# Patient Record
Sex: FEMALE | Race: WHITE | NOT HISPANIC OR LATINO
[De-identification: names, ages, dates, MRNs, and addresses within clinical notes are randomized per-mention and may not be internally consistent; named-entity substitution may affect disease eponyms.]

---

## 2023-05-08 ENCOUNTER — NON-APPOINTMENT (OUTPATIENT)
Age: 48
End: 2023-05-08

## 2023-05-08 ENCOUNTER — APPOINTMENT (OUTPATIENT)
Dept: COLORECTAL SURGERY | Facility: CLINIC | Age: 48
End: 2023-05-08
Payer: COMMERCIAL

## 2023-05-08 VITALS
BODY MASS INDEX: 34.55 KG/M2 | HEIGHT: 60 IN | WEIGHT: 176 LBS | DIASTOLIC BLOOD PRESSURE: 85 MMHG | HEART RATE: 94 BPM | TEMPERATURE: 98.3 F | SYSTOLIC BLOOD PRESSURE: 152 MMHG

## 2023-05-08 DIAGNOSIS — Z78.9 OTHER SPECIFIED HEALTH STATUS: ICD-10-CM

## 2023-05-08 PROCEDURE — 99205 OFFICE O/P NEW HI 60 MIN: CPT

## 2023-05-08 RX ORDER — MULTIVITAMIN
TABLET ORAL
Refills: 0 | Status: ACTIVE | COMMUNITY

## 2023-05-08 RX ORDER — PHENTERMINE HYDROCHLORIDE 37.5 MG/1
CAPSULE ORAL
Refills: 0 | Status: ACTIVE | COMMUNITY

## 2023-05-08 RX ORDER — ASPIRIN 325 MG/1
TABLET, FILM COATED ORAL
Refills: 0 | Status: ACTIVE | COMMUNITY

## 2023-05-08 RX ORDER — NORETHINDRONE ACETATE/ETHINYL ESTRADIOL AND FERROUS FUMARATE 1MG-20(24)
KIT ORAL
Refills: 0 | Status: ACTIVE | COMMUNITY

## 2023-05-08 NOTE — HISTORY OF PRESENT ILLNESS
[FreeTextEntry1] : 47 yo F presents for evaluation of colon cancer, referred by [facility]\par Denies PMH\par PSH  (14 years ago), gastric bypass (9 years ago), cholecystectomy (2022)\par Medication: Oral contraceptive, ASA 81 mg, phentermine \par Denies FMH CRC\par \par Pt underwent initial colonoscopy on 2023, prep fair.\par Melanosis coli, redundant colon\par Tumor in proximal sigmoid colon, biopsied, tattooed\par Plan: PET/CT\par \par Pathology:\par Minute superficial fragment of adenocarcinoma\par Separate fragment of colonic mucosa showing focal hyperplastic change\par Note: Depth of invasion cannot be accurately evaluated on this superficial fragment of biopsy specimen\par \par Denies fever, unintentional weight loss, abd pain, n/v/d, change in bowel habits, BRBPR\par \par BH: daily, takes HerbLax daily\par h/o constipation that she previously managed w/ Dulcolax as needed\par \par Takes  ASA 81 mg daily, denies h/o heart disease or DVT\par

## 2023-05-08 NOTE — ASSESSMENT
[FreeTextEntry1] : I had extensive discussion with the patient and her –60 minutes–regarding the diagnosis and treatment options. I recommended that he consider proceeding with a robotic sigmoid colectomy.\par The associated risks, benefits, alternatives of the procedure have been outlined discussed and reviewed with the patient's family. These risks including but not limited to bleeding, infection, anastomotic leak, need for secondary surgery, need for ileostomy or colostomy creation, change in bowel habits,  as well as the risk of heart and lung complications infection and death were detailed. The patient understands these risks and consents the planned procedure. Appropriate  literature regarding surgery and post operative treatment/complications and enhanced recovery pathway has been detailed and reviewed. Consent was obtained. All questions were answered.\par \par CT scan ordered.\par \par

## 2023-05-09 ENCOUNTER — RESULT REVIEW (OUTPATIENT)
Age: 48
End: 2023-05-09

## 2023-05-09 PROBLEM — Z78.9 NON-SMOKER: Status: ACTIVE | Noted: 2023-05-08

## 2023-05-09 RX ORDER — CIPROFLOXACIN HYDROCHLORIDE 500 MG/1
500 TABLET, FILM COATED ORAL
Qty: 2 | Refills: 0 | Status: ACTIVE | COMMUNITY
Start: 2023-05-09 | End: 1900-01-01

## 2023-05-09 RX ORDER — METRONIDAZOLE 500 MG/1
500 TABLET ORAL
Qty: 6 | Refills: 0 | Status: ACTIVE | COMMUNITY
Start: 2023-05-09 | End: 1900-01-01

## 2023-05-11 ENCOUNTER — APPOINTMENT (OUTPATIENT)
Dept: CT IMAGING | Facility: CLINIC | Age: 48
End: 2023-05-11
Payer: COMMERCIAL

## 2023-05-11 ENCOUNTER — OUTPATIENT (OUTPATIENT)
Dept: OUTPATIENT SERVICES | Facility: HOSPITAL | Age: 48
LOS: 1 days | End: 2023-05-11

## 2023-05-11 PROCEDURE — 74177 CT ABD & PELVIS W/CONTRAST: CPT | Mod: 26

## 2023-05-11 PROCEDURE — 71260 CT THORAX DX C+: CPT | Mod: 26

## 2023-05-15 ENCOUNTER — OUTPATIENT (OUTPATIENT)
Dept: OUTPATIENT SERVICES | Facility: HOSPITAL | Age: 48
LOS: 1 days | End: 2023-05-15
Payer: COMMERCIAL

## 2023-05-15 ENCOUNTER — TRANSCRIPTION ENCOUNTER (OUTPATIENT)
Age: 48
End: 2023-05-15

## 2023-05-15 DIAGNOSIS — Z90.49 ACQUIRED ABSENCE OF OTHER SPECIFIED PARTS OF DIGESTIVE TRACT: Chronic | ICD-10-CM

## 2023-05-15 DIAGNOSIS — Z98.891 HISTORY OF UTERINE SCAR FROM PREVIOUS SURGERY: Chronic | ICD-10-CM

## 2023-05-15 DIAGNOSIS — Z98.84 BARIATRIC SURGERY STATUS: Chronic | ICD-10-CM

## 2023-05-15 DIAGNOSIS — C18.7 MALIGNANT NEOPLASM OF SIGMOID COLON: ICD-10-CM

## 2023-05-15 NOTE — H&P ADULT - NSHPLABSRESULTS_GEN_ALL_CORE
CT Chest/A/P  5/11/23  - No adenopathy or metastatic disease     Labs 5/11/23  1. Renal panel WNL  2. LFT WNL  3. CEA 2.1 ng/mL  4. INR 1.0 / APTT 26.0  5. WBC 7.2 , Hb 13.6, Hct  41 ,

## 2023-05-15 NOTE — H&P ADULT - NSICDXPASTSURGICALHX_GEN_ALL_CORE_FT
PAST SURGICAL HISTORY:  H/O:  section     History of cholecystectomy     S/P laparoscopic sleeve gastrectomy

## 2023-05-15 NOTE — H&P ADULT - HISTORY OF PRESENT ILLNESS
This is a 47yo lady who underwent screening colonoscopy. She denies any GI symtoms.    A 5cm polypoid mass was found in the sigmoid colon, Biopsy Minute superficial fragment of adenocarcinoma  Separate fragment of colonic mucosa showing focal hyperplastic change . Note: Depth of invasion cannot be accurately evaluated on this superficial   fragment of biopsy specimen.  The tumour  was tattooed.    Staging CT scan was done and did not show any mets disease ( 5/11/23)

## 2023-05-15 NOTE — H&P ADULT - NSHPPHYSICALEXAM_GEN_ALL_CORE
BMI 34    Gastrointestinal: No abdominal masses. No abdominal tenderness.   Liver:. no hepatosplenomegaly.   Neck: no jugular venous distention.   Respiratory: Normal breath sounds.   Cardiovascular: normal heart sounds.   Skin:. no rash or lesion.   Neurologic: alert.

## 2023-05-15 NOTE — H&P ADULT - ASSESSMENT
This is a 49yo lady with sigmoid tumour diagnosed on screening colonoscopy. Staging CT is negative for distant disease.  Her PShx   is significant for Lap  sleeve gastrectomy,cholecystectomy and C/section.  She is here for a Robotic assist sigmoid colectomy.  Plan 1. Consent 2. Heparin 5000u sc. 3. Type /Screen 4. ERP

## 2023-05-16 ENCOUNTER — TRANSCRIPTION ENCOUNTER (OUTPATIENT)
Age: 48
End: 2023-05-16

## 2023-05-16 ENCOUNTER — INPATIENT (INPATIENT)
Facility: HOSPITAL | Age: 48
LOS: 2 days | Discharge: HOME | DRG: 331 | End: 2023-05-19
Attending: SURGERY | Admitting: SURGERY
Payer: COMMERCIAL

## 2023-05-16 ENCOUNTER — APPOINTMENT (OUTPATIENT)
Dept: COLORECTAL SURGERY | Facility: HOSPITAL | Age: 48
End: 2023-05-16

## 2023-05-16 ENCOUNTER — RESULT REVIEW (OUTPATIENT)
Age: 48
End: 2023-05-16

## 2023-05-16 VITALS
OXYGEN SATURATION: 99 % | HEIGHT: 60 IN | DIASTOLIC BLOOD PRESSURE: 91 MMHG | TEMPERATURE: 98 F | RESPIRATION RATE: 18 BRPM | SYSTOLIC BLOOD PRESSURE: 163 MMHG | WEIGHT: 174.17 LBS | HEART RATE: 78 BPM

## 2023-05-16 DIAGNOSIS — Z98.891 HISTORY OF UTERINE SCAR FROM PREVIOUS SURGERY: Chronic | ICD-10-CM

## 2023-05-16 DIAGNOSIS — Z90.49 ACQUIRED ABSENCE OF OTHER SPECIFIED PARTS OF DIGESTIVE TRACT: Chronic | ICD-10-CM

## 2023-05-16 DIAGNOSIS — Z98.84 BARIATRIC SURGERY STATUS: Chronic | ICD-10-CM

## 2023-05-16 LAB
BLD GP AB SCN SERPL QL: NEGATIVE — SIGNIFICANT CHANGE UP
RH IG SCN BLD-IMP: POSITIVE — SIGNIFICANT CHANGE UP
SURGICAL PATHOLOGY STUDY: SIGNIFICANT CHANGE UP

## 2023-05-16 PROCEDURE — 88341 IMHCHEM/IMCYTCHM EA ADD ANTB: CPT | Mod: 26,59

## 2023-05-16 PROCEDURE — 45330 DIAGNOSTIC SIGMOIDOSCOPY: CPT | Mod: GC

## 2023-05-16 PROCEDURE — 88304 TISSUE EXAM BY PATHOLOGIST: CPT | Mod: 26,59

## 2023-05-16 PROCEDURE — 88321 CONSLTJ&REPRT SLD PREP ELSWR: CPT

## 2023-05-16 PROCEDURE — 44213 LAP MOBIL SPLENIC FL ADD-ON: CPT | Mod: GC

## 2023-05-16 PROCEDURE — 88342 IMHCHEM/IMCYTCHM 1ST ANTB: CPT | Mod: 26,59

## 2023-05-16 PROCEDURE — 88309 TISSUE EXAM BY PATHOLOGIST: CPT | Mod: 26,59

## 2023-05-16 PROCEDURE — 44207 L COLECTOMY/COLOPROCTOSTOMY: CPT | Mod: GC

## 2023-05-16 DEVICE — STAPLER COVIDIEN PURSTRING 65MM: Type: IMPLANTABLE DEVICE | Status: FUNCTIONAL

## 2023-05-16 DEVICE — STAPLER COVIDIEN DST EEA 31MM 4.8MM: Type: IMPLANTABLE DEVICE | Status: FUNCTIONAL

## 2023-05-16 DEVICE — XI STAPLER SUREFORM RELOAD 60 GREEN: Type: IMPLANTABLE DEVICE | Status: FUNCTIONAL

## 2023-05-16 RX ORDER — ACETAMINOPHEN 500 MG
1000 TABLET ORAL EVERY 6 HOURS
Refills: 0 | Status: DISCONTINUED | OUTPATIENT
Start: 2023-05-16 | End: 2023-05-19

## 2023-05-16 RX ORDER — HYDROMORPHONE HYDROCHLORIDE 2 MG/ML
0.25 INJECTION INTRAMUSCULAR; INTRAVENOUS; SUBCUTANEOUS
Refills: 0 | Status: DISCONTINUED | OUTPATIENT
Start: 2023-05-16 | End: 2023-05-17

## 2023-05-16 RX ORDER — SODIUM CHLORIDE 9 MG/ML
1000 INJECTION, SOLUTION INTRAVENOUS
Refills: 0 | Status: DISCONTINUED | OUTPATIENT
Start: 2023-05-16 | End: 2023-05-17

## 2023-05-16 RX ORDER — KETOROLAC TROMETHAMINE 30 MG/ML
15 SYRINGE (ML) INJECTION EVERY 6 HOURS
Refills: 0 | Status: COMPLETED | OUTPATIENT
Start: 2023-05-16 | End: 2023-05-19

## 2023-05-16 RX ORDER — ONDANSETRON 8 MG/1
4 TABLET, FILM COATED ORAL EVERY 6 HOURS
Refills: 0 | Status: DISCONTINUED | OUTPATIENT
Start: 2023-05-16 | End: 2023-05-19

## 2023-05-16 RX ORDER — HEPARIN SODIUM 5000 [USP'U]/ML
5000 INJECTION INTRAVENOUS; SUBCUTANEOUS ONCE
Refills: 0 | Status: COMPLETED | OUTPATIENT
Start: 2023-05-16 | End: 2023-05-16

## 2023-05-16 RX ORDER — HYDROMORPHONE HYDROCHLORIDE 2 MG/ML
0.5 INJECTION INTRAMUSCULAR; INTRAVENOUS; SUBCUTANEOUS EVERY 4 HOURS
Refills: 0 | Status: DISCONTINUED | OUTPATIENT
Start: 2023-05-16 | End: 2023-05-18

## 2023-05-16 RX ORDER — HEPARIN SODIUM 5000 [USP'U]/ML
5000 INJECTION INTRAVENOUS; SUBCUTANEOUS EVERY 8 HOURS
Refills: 0 | Status: DISCONTINUED | OUTPATIENT
Start: 2023-05-16 | End: 2023-05-19

## 2023-05-16 RX ORDER — ACETAMINOPHEN 500 MG
1000 TABLET ORAL ONCE
Refills: 0 | Status: COMPLETED | OUTPATIENT
Start: 2023-05-16 | End: 2023-05-16

## 2023-05-16 RX ORDER — NORETHINDRONE AND ETHINYL ESTRADIOL 0.4-0.035
1 KIT ORAL
Refills: 0 | DISCHARGE

## 2023-05-16 RX ADMIN — HYDROMORPHONE HYDROCHLORIDE 0.5 MILLIGRAM(S): 2 INJECTION INTRAMUSCULAR; INTRAVENOUS; SUBCUTANEOUS at 17:50

## 2023-05-16 RX ADMIN — Medication 1000 MILLIGRAM(S): at 11:25

## 2023-05-16 RX ADMIN — HEPARIN SODIUM 5000 UNIT(S): 5000 INJECTION INTRAVENOUS; SUBCUTANEOUS at 21:40

## 2023-05-16 RX ADMIN — HEPARIN SODIUM 5000 UNIT(S): 5000 INJECTION INTRAVENOUS; SUBCUTANEOUS at 11:26

## 2023-05-16 RX ADMIN — HYDROMORPHONE HYDROCHLORIDE 0.5 MILLIGRAM(S): 2 INJECTION INTRAMUSCULAR; INTRAVENOUS; SUBCUTANEOUS at 17:13

## 2023-05-16 NOTE — BRIEF OPERATIVE NOTE - ANTIBIOTIC PROTOCOL
Addended by: Dominik Spicer on: 2/19/2021 09:24 AM     Modules accepted: Orders, Level of Service, SmartSet
Followed protocol

## 2023-05-16 NOTE — PROGRESS NOTE ADULT - SUBJECTIVE AND OBJECTIVE BOX
Procedure: Robot-assisted sigmoid colectomy  Surgeon: Dr. Valdez    S: Pt seen and examined bedside. She is resting comfortably and has no acute complaints. Pain controlled with medication. She is tolerating clear liquid diet. -BF.  Denies CP, SOB, PAGAN, calf tenderness or edema, nausea, emesis, or fevers.     O:  T(C): 36.7 (23 @ 20:50), Max: 36.7 (23 @ 20:50)  T(F): 98 (- @ 20:50), Max: 98 (23 @ 20:50)  HR: 76 (23 @ 20:50) (65 - 76)  BP: 128/81 (23 @ 20:50) (128/81 - 136/84)  RR: 17 (23 @ 20:50) (17 - 17)  SpO2: 99% (23 @ 20:50) (99% - 99%)  Wt(kg): --        Gen: NAD, resting comfortably in bed, non-rebreather in place  C/V: NSR  Pulm: Nonlabored breathing, no respiratory distress  Abd: soft, appropriate incisional tenderness, non-distended Incision: c/d/i  : gerard draining clear yellow urine  Extrem: WWP, no calf edema or tenderness, SCDs in place      A/P: Patient is a 49yo Female w/no PMHx and PSHx of LSG, cholecystectomy, and  who presents for RA lap sigmoidectomy. Colonoscopy conducted showed a 5cm polypoid mass in the sigmoid colon with biopsy as adenocarcinoma. Staging CT was performed  without evidence of metastatic disease. Patient now s/p RA laparoscopic sigmoidectomy .    ERAS  CLD/IVF@40cc/hr  Pain/Nausea control PRN  Gerard - plan to remove POD 1  HSQ/SCD  IS/OOB

## 2023-05-16 NOTE — BRIEF OPERATIVE NOTE - OPERATION/FINDINGS
Veress needle. Opticview entry. Under visualisation rest of ports inserted. Bilateral SARAHI block. Small bowel stacked away.Patient positioned. Robot docked. Targetting performed. Medial to lateral dissection. High AYSHA division. High IMV division. Further dissection to mobilise splenic flexure. Tattoo identified. Upper rectum mobilised. Bowel exteriosed via RLQ incision .  ICG confirmer perfusion prior to proximal bowel division.  End-end anastomosis performed with       " CEE. Flexisigmoidoscopy ; leak test    , doughnuts      , anastomosis at     cm. Veress needle. Opticview entry. Under visualisation rest of ports inserted. Bilateral SARAHI block. Small bowel stacked away.Patient positioned. Robot docked. Targetting performed. Medial to lateral dissection. High AYSHA division. High IMV division. Further dissection to mobilise splenic flexure. Tattoo identified. Mesocolon divided towards point of transaction. Upper rectum mobilised. Bowel exteriosed via RLQ incision .  ICG confirmer perfusion prior to bowel division.  End-end anastomosis performed with  31 CEE. Flexisigmoidoscopy ; leak test negative , doughnuts  intach   , anastomosis no  active bleeding. Closure in usual   manner.

## 2023-05-17 LAB
ANION GAP SERPL CALC-SCNC: 10 MMOL/L — SIGNIFICANT CHANGE UP (ref 5–17)
BUN SERPL-MCNC: 8 MG/DL — SIGNIFICANT CHANGE UP (ref 7–23)
CALCIUM SERPL-MCNC: 8.6 MG/DL — SIGNIFICANT CHANGE UP (ref 8.4–10.5)
CHLORIDE SERPL-SCNC: 104 MMOL/L — SIGNIFICANT CHANGE UP (ref 96–108)
CO2 SERPL-SCNC: 24 MMOL/L — SIGNIFICANT CHANGE UP (ref 22–31)
CREAT SERPL-MCNC: 0.71 MG/DL — SIGNIFICANT CHANGE UP (ref 0.5–1.3)
EGFR: 104 ML/MIN/1.73M2 — SIGNIFICANT CHANGE UP
GLUCOSE SERPL-MCNC: 112 MG/DL — HIGH (ref 70–99)
HCT VFR BLD CALC: 35.7 % — SIGNIFICANT CHANGE UP (ref 34.5–45)
HGB BLD-MCNC: 11.6 G/DL — SIGNIFICANT CHANGE UP (ref 11.5–15.5)
MAGNESIUM SERPL-MCNC: 1.7 MG/DL — SIGNIFICANT CHANGE UP (ref 1.6–2.6)
MCHC RBC-ENTMCNC: 30.3 PG — SIGNIFICANT CHANGE UP (ref 27–34)
MCHC RBC-ENTMCNC: 32.5 GM/DL — SIGNIFICANT CHANGE UP (ref 32–36)
MCV RBC AUTO: 93.2 FL — SIGNIFICANT CHANGE UP (ref 80–100)
NRBC # BLD: 0 /100 WBCS — SIGNIFICANT CHANGE UP (ref 0–0)
PHOSPHATE SERPL-MCNC: 3.2 MG/DL — SIGNIFICANT CHANGE UP (ref 2.5–4.5)
PLATELET # BLD AUTO: 204 K/UL — SIGNIFICANT CHANGE UP (ref 150–400)
POTASSIUM SERPL-MCNC: 3.7 MMOL/L — SIGNIFICANT CHANGE UP (ref 3.5–5.3)
POTASSIUM SERPL-SCNC: 3.7 MMOL/L — SIGNIFICANT CHANGE UP (ref 3.5–5.3)
RBC # BLD: 3.83 M/UL — SIGNIFICANT CHANGE UP (ref 3.8–5.2)
RBC # FLD: 12.5 % — SIGNIFICANT CHANGE UP (ref 10.3–14.5)
SODIUM SERPL-SCNC: 138 MMOL/L — SIGNIFICANT CHANGE UP (ref 135–145)
WBC # BLD: 11.03 K/UL — HIGH (ref 3.8–10.5)
WBC # FLD AUTO: 11.03 K/UL — HIGH (ref 3.8–10.5)

## 2023-05-17 RX ORDER — MAGNESIUM SULFATE 500 MG/ML
1 VIAL (ML) INJECTION ONCE
Refills: 0 | Status: COMPLETED | OUTPATIENT
Start: 2023-05-17 | End: 2023-05-17

## 2023-05-17 RX ORDER — SODIUM CHLORIDE 9 MG/ML
1000 INJECTION, SOLUTION INTRAVENOUS
Refills: 0 | Status: DISCONTINUED | OUTPATIENT
Start: 2023-05-17 | End: 2023-05-18

## 2023-05-17 RX ORDER — LANOLIN ALCOHOL/MO/W.PET/CERES
5 CREAM (GRAM) TOPICAL ONCE
Refills: 0 | Status: COMPLETED | OUTPATIENT
Start: 2023-05-17 | End: 2023-05-17

## 2023-05-17 RX ORDER — POTASSIUM CHLORIDE 20 MEQ
40 PACKET (EA) ORAL ONCE
Refills: 0 | Status: COMPLETED | OUTPATIENT
Start: 2023-05-17 | End: 2023-05-17

## 2023-05-17 RX ADMIN — Medication 100 GRAM(S): at 12:36

## 2023-05-17 RX ADMIN — Medication 1000 MILLIGRAM(S): at 23:13

## 2023-05-17 RX ADMIN — Medication 40 MILLIEQUIVALENT(S): at 12:36

## 2023-05-17 RX ADMIN — Medication 15 MILLIGRAM(S): at 20:10

## 2023-05-17 RX ADMIN — Medication 15 MILLIGRAM(S): at 14:22

## 2023-05-17 RX ADMIN — Medication 1000 MILLIGRAM(S): at 17:24

## 2023-05-17 RX ADMIN — SODIUM CHLORIDE 40 MILLILITER(S): 9 INJECTION, SOLUTION INTRAVENOUS at 16:27

## 2023-05-17 RX ADMIN — SODIUM CHLORIDE 40 MILLILITER(S): 9 INJECTION, SOLUTION INTRAVENOUS at 20:10

## 2023-05-17 RX ADMIN — Medication 5 MILLIGRAM(S): at 21:57

## 2023-05-17 RX ADMIN — Medication 1000 MILLIGRAM(S): at 05:31

## 2023-05-17 RX ADMIN — HEPARIN SODIUM 5000 UNIT(S): 5000 INJECTION INTRAVENOUS; SUBCUTANEOUS at 14:20

## 2023-05-17 RX ADMIN — HEPARIN SODIUM 5000 UNIT(S): 5000 INJECTION INTRAVENOUS; SUBCUTANEOUS at 21:57

## 2023-05-17 RX ADMIN — HEPARIN SODIUM 5000 UNIT(S): 5000 INJECTION INTRAVENOUS; SUBCUTANEOUS at 05:31

## 2023-05-17 RX ADMIN — Medication 15 MILLIGRAM(S): at 08:03

## 2023-05-17 RX ADMIN — Medication 1000 MILLIGRAM(S): at 11:10

## 2023-05-17 NOTE — PROGRESS NOTE ADULT - SUBJECTIVE AND OBJECTIVE BOX
INTERVAL HPI/OVERNIGHT EVENTS: S/p RA sigmoidectomy, POC wnl, VSS    STATUS POST:  5/16: RA Lap Sigmoid EBL 20, IVF 1700,     POST OPERATIVE DAY #: 1    SUBJECTIVE: Pt seen and examined at bedside this am by surgery team. No acute complaints. -F/-BM. Tolerating diet, pain well controlled. Denies f/n/v/cp/sob.    MEDICATIONS  (STANDING):  acetaminophen     Tablet .. 1000 milliGRAM(s) Oral every 6 hours  heparin   Injectable 5000 Unit(s) SubCutaneous every 8 hours  ketorolac   Injectable 15 milliGRAM(s) IV Push every 6 hours  lactated ringers. 1000 milliLiter(s) (40 mL/Hr) IV Continuous <Continuous>    MEDICATIONS  (PRN):  HYDROmorphone  Injectable 0.5 milliGRAM(s) IV Push every 4 hours PRN Severe Pain (7 - 10)  HYDROmorphone  Injectable 0.25 milliGRAM(s) IV Push every 15 minutes PRN Severe Pain (7 - 10)  ondansetron Injectable 4 milliGRAM(s) IV Push every 6 hours PRN Nausea and/or Vomiting    Vital Signs Last 24 Hrs  T(C): 36.9 (17 May 2023 05:00), Max: 36.9 (16 May 2023 11:11)  T(F): 98.5 (17 May 2023 05:00), Max: 98.5 (17 May 2023 05:00)  HR: 72 (17 May 2023 05:00) (58 - 78)  BP: 132/77 (17 May 2023 05:00) (128/81 - 163/91)  BP(mean): 95 (17 May 2023 05:00) (95 - 108)  RR: 17 (17 May 2023 05:00) (10 - 18)  SpO2: 97% (17 May 2023 05:00) (97% - 100%)    Parameters below as of 17 May 2023 05:00  Patient On (Oxygen Delivery Method): room air    PHYSICAL EXAM:    Constitutional: A&Ox3, NAD    Respiratory: non labored breathing, no respiratory distress    Cardiovascular: NSR, RRR    Gastrointestinal: abdomen soft, nd, appropriately ttp to incisions. Dressings c/d/i.    Genitourinary: Isbell in place draining clear yellow urine     Extremities: wwp, no calf tenderness or edema. SCDs in place      I&O's Detail    16 May 2023 07:01  -  17 May 2023 07:00  --------------------------------------------------------  IN:    Lactated Ringers: 620 mL  Total IN: 620 mL    OUT:    Indwelling Catheter - Urethral (mL): 815 mL  Total OUT: 815 mL    Total NET: -195 mL          LABS:      Phos  3.2     05-17            RADIOLOGY & ADDITIONAL STUDIES:

## 2023-05-18 LAB
ANION GAP SERPL CALC-SCNC: 6 MMOL/L — SIGNIFICANT CHANGE UP (ref 5–17)
BUN SERPL-MCNC: 5 MG/DL — LOW (ref 7–23)
CALCIUM SERPL-MCNC: 7.8 MG/DL — LOW (ref 8.4–10.5)
CHLORIDE SERPL-SCNC: 105 MMOL/L — SIGNIFICANT CHANGE UP (ref 96–108)
CO2 SERPL-SCNC: 27 MMOL/L — SIGNIFICANT CHANGE UP (ref 22–31)
CREAT SERPL-MCNC: 0.8 MG/DL — SIGNIFICANT CHANGE UP (ref 0.5–1.3)
EGFR: 90 ML/MIN/1.73M2 — SIGNIFICANT CHANGE UP
GLUCOSE SERPL-MCNC: 94 MG/DL — SIGNIFICANT CHANGE UP (ref 70–99)
HCT VFR BLD CALC: 35.2 % — SIGNIFICANT CHANGE UP (ref 34.5–45)
HGB BLD-MCNC: 11.3 G/DL — LOW (ref 11.5–15.5)
MAGNESIUM SERPL-MCNC: 1.8 MG/DL — SIGNIFICANT CHANGE UP (ref 1.6–2.6)
MCHC RBC-ENTMCNC: 31 PG — SIGNIFICANT CHANGE UP (ref 27–34)
MCHC RBC-ENTMCNC: 32.1 GM/DL — SIGNIFICANT CHANGE UP (ref 32–36)
MCV RBC AUTO: 96.7 FL — SIGNIFICANT CHANGE UP (ref 80–100)
NRBC # BLD: 0 /100 WBCS — SIGNIFICANT CHANGE UP (ref 0–0)
PHOSPHATE SERPL-MCNC: 2 MG/DL — LOW (ref 2.5–4.5)
PLATELET # BLD AUTO: 184 K/UL — SIGNIFICANT CHANGE UP (ref 150–400)
POTASSIUM SERPL-MCNC: 4 MMOL/L — SIGNIFICANT CHANGE UP (ref 3.5–5.3)
POTASSIUM SERPL-SCNC: 4 MMOL/L — SIGNIFICANT CHANGE UP (ref 3.5–5.3)
RBC # BLD: 3.64 M/UL — LOW (ref 3.8–5.2)
RBC # FLD: 12.9 % — SIGNIFICANT CHANGE UP (ref 10.3–14.5)
SODIUM SERPL-SCNC: 138 MMOL/L — SIGNIFICANT CHANGE UP (ref 135–145)
WBC # BLD: 6.93 K/UL — SIGNIFICANT CHANGE UP (ref 3.8–10.5)
WBC # FLD AUTO: 6.93 K/UL — SIGNIFICANT CHANGE UP (ref 3.8–10.5)

## 2023-05-18 RX ORDER — MAGNESIUM SULFATE 500 MG/ML
1 VIAL (ML) INJECTION ONCE
Refills: 0 | Status: COMPLETED | OUTPATIENT
Start: 2023-05-18 | End: 2023-05-18

## 2023-05-18 RX ORDER — OXYCODONE HYDROCHLORIDE 5 MG/1
5 TABLET ORAL EVERY 6 HOURS
Refills: 0 | Status: DISCONTINUED | OUTPATIENT
Start: 2023-05-18 | End: 2023-05-19

## 2023-05-18 RX ORDER — LANOLIN ALCOHOL/MO/W.PET/CERES
5 CREAM (GRAM) TOPICAL AT BEDTIME
Refills: 0 | Status: DISCONTINUED | OUTPATIENT
Start: 2023-05-18 | End: 2023-05-19

## 2023-05-18 RX ADMIN — HEPARIN SODIUM 5000 UNIT(S): 5000 INJECTION INTRAVENOUS; SUBCUTANEOUS at 14:25

## 2023-05-18 RX ADMIN — Medication 15 MILLIGRAM(S): at 14:25

## 2023-05-18 RX ADMIN — Medication 15 MILLIGRAM(S): at 07:01

## 2023-05-18 RX ADMIN — Medication 1000 MILLIGRAM(S): at 17:14

## 2023-05-18 RX ADMIN — Medication 5 MILLIGRAM(S): at 22:37

## 2023-05-18 RX ADMIN — HEPARIN SODIUM 5000 UNIT(S): 5000 INJECTION INTRAVENOUS; SUBCUTANEOUS at 22:37

## 2023-05-18 RX ADMIN — Medication 15 MILLIGRAM(S): at 19:09

## 2023-05-18 RX ADMIN — Medication 100 GRAM(S): at 09:41

## 2023-05-18 RX ADMIN — Medication 62.5 MILLIMOLE(S): at 11:20

## 2023-05-18 RX ADMIN — HEPARIN SODIUM 5000 UNIT(S): 5000 INJECTION INTRAVENOUS; SUBCUTANEOUS at 05:37

## 2023-05-18 RX ADMIN — Medication 1000 MILLIGRAM(S): at 11:16

## 2023-05-18 RX ADMIN — Medication 15 MILLIGRAM(S): at 01:11

## 2023-05-18 RX ADMIN — Medication 1000 MILLIGRAM(S): at 05:37

## 2023-05-18 NOTE — PROGRESS NOTE ADULT - SUBJECTIVE AND OBJECTIVE BOX
INCOMPLETE:  STATUS POST:       ON:     SUBJECTIVE: Patient seen and examined bedside by chief resident.    heparin   Injectable 5000 Unit(s) SubCutaneous every 8 hours      Vital Signs Last 24 Hrs  T(C): 37.1 (18 May 2023 05:16), Max: 37.2 (17 May 2023 20:41)  T(F): 98.7 (18 May 2023 05:16), Max: 98.9 (17 May 2023 20:41)  HR: 65 (18 May 2023 05:16) (65 - 81)  BP: 114/79 (18 May 2023 05:16) (110/74 - 122/82)  BP(mean): 85 (17 May 2023 20:41) (85 - 85)  RR: 17 (18 May 2023 05:16) (17 - 18)  SpO2: 95% (18 May 2023 05:16) (95% - 98%)    Parameters below as of 18 May 2023 05:16  Patient On (Oxygen Delivery Method): room air      I&O's Detail    17 May 2023 07:01  -  18 May 2023 07:00  --------------------------------------------------------  IN:    dextrose 5% + sodium chloride 0.45%: 600 mL    Lactated Ringers: 280 mL    Oral Fluid: 500 mL  Total IN: 1380 mL    OUT:    Indwelling Catheter - Urethral (mL): 550 mL    Voided (mL): 1250 mL  Total OUT: 1800 mL    Total NET: -420 mL          General: NAD, resting comfortably in bed  C/V: NSR  Pulm: Nonlabored breathing, no respiratory distress  Abd: soft, NT/ND.  Extrem: WWP, no edema, SCDs in place        LABS:                        11.6   11.03 )-----------( 204      ( 17 May 2023 12:05 )             35.7     05-17    138  |  104  |  8   ----------------------------<  112<H>  3.7   |  24  |  0.71    Ca    8.6      17 May 2023 07:03  Phos  3.2     05-17  Mg     1.7     05-17            RADIOLOGY & ADDITIONAL STUDIES:   STATUS POST:  5/16: RA Lap Sigmoid      ON:  OOB. x1 melatonin. -N/-V, -F/-BM. abd soft, nondistended. mild tenderness near incisions.     SUBJECTIVE: Patient seen and examined bedside by chief resident, patient has no acute complaints. Tolerating diet, -n/-v/-f/-bm. Denies sob/scanlon/cp/palpations    heparin   Injectable 5000 Unit(s) SubCutaneous every 8 hours      Vital Signs Last 24 Hrs  T(C): 37.1 (18 May 2023 05:16), Max: 37.2 (17 May 2023 20:41)  T(F): 98.7 (18 May 2023 05:16), Max: 98.9 (17 May 2023 20:41)  HR: 65 (18 May 2023 05:16) (65 - 81)  BP: 114/79 (18 May 2023 05:16) (110/74 - 122/82)  BP(mean): 85 (17 May 2023 20:41) (85 - 85)  RR: 17 (18 May 2023 05:16) (17 - 18)  SpO2: 95% (18 May 2023 05:16) (95% - 98%)    Parameters below as of 18 May 2023 05:16  Patient On (Oxygen Delivery Method): room air      I&O's Detail    17 May 2023 07:01  -  18 May 2023 07:00  --------------------------------------------------------  IN:    dextrose 5% + sodium chloride 0.45%: 600 mL    Lactated Ringers: 280 mL    Oral Fluid: 500 mL  Total IN: 1380 mL    OUT:    Indwelling Catheter - Urethral (mL): 550 mL    Voided (mL): 1250 mL  Total OUT: 1800 mL    Total NET: -420 mL          General: NAD, resting comfortably in bed  C/V: NSR  Pulm: Nonlabored breathing, no respiratory distress, room air  Abd: soft, NT/ND. No rebound or guarding  Incisions; c/d/i  Extrem: WWP, no edema, SCDs in place        LABS:                        11.6   11.03 )-----------( 204      ( 17 May 2023 12:05 )             35.7     05-17    138  |  104  |  8   ----------------------------<  112<H>  3.7   |  24  |  0.71    Ca    8.6      17 May 2023 07:03  Phos  3.2     05-17  Mg     1.7     05-17            RADIOLOGY & ADDITIONAL STUDIES:

## 2023-05-18 NOTE — DIETITIAN INITIAL EVALUATION ADULT - PERTINENT LABORATORY DATA
05-18    138  |  105  |  5<L>  ----------------------------<  94  4.0   |  27  |  0.80    Ca    7.8<L>      18 May 2023 07:39  Phos  2.0     05-18  Mg     1.8     05-18

## 2023-05-18 NOTE — DIETITIAN INITIAL EVALUATION ADULT - ADD RECOMMEND
1. Continue with current diet order  >>as medically feasible, recommend to advance diet as tolerated >> Low Fiber diet   2. Encourage pt to meet nutritional needs as able   3. Monitor PO intakes, trend weights (weekly), monitor skin integrity, monitor labs (electrolytes, CMP), monitor GI fxn   4. Encourage adherence to diet education (reinforce as able)  5. Pain and bowel regimen per team   6. Will continue to assess/honor preferences as able   7. Align nutrition interventions with GOC at all times

## 2023-05-18 NOTE — DIETITIAN INITIAL EVALUATION ADULT - PERTINENT MEDS FT
MEDICATIONS  (STANDING):  acetaminophen     Tablet .. 1000 milliGRAM(s) Oral every 6 hours  heparin   Injectable 5000 Unit(s) SubCutaneous every 8 hours  ketorolac   Injectable 15 milliGRAM(s) IV Push every 6 hours    MEDICATIONS  (PRN):  ondansetron Injectable 4 milliGRAM(s) IV Push every 6 hours PRN Nausea and/or Vomiting  oxyCODONE    IR 5 milliGRAM(s) Oral every 6 hours PRN Severe Pain (7 - 10)

## 2023-05-18 NOTE — DIETITIAN INITIAL EVALUATION ADULT - NUTRITIONGOAL OUTCOME1
Pt to consistently meet at least 75% of EEE via tolerated route that is consistent with GOC during hospital stay;  Pt's diet to be advanced within the next 24-48h

## 2023-05-18 NOTE — DIETITIAN INITIAL EVALUATION ADULT - PERSON TAUGHT/METHOD
Pt amenable to education; RD provided education in regards to the importance of adequate macro and micronutrients, as well as hydration to support ADLs, maintain energy levels and overall functional/nutritional status. General healthful education provided. Nutrient dense foods promoted. Low Fiber diet education provided, RD discussed examples of foods to include and limit at this time, written handouts provided. Pt was receptive and verbalized understanding./verbal instruction/written material/teach back - (Patient repeats in own words)/patient instructed/spouse instructed

## 2023-05-18 NOTE — DIETITIAN INITIAL EVALUATION ADULT - OTHER INFO
Patient is a 49yo Female w/no PMHx and PSHx of LSG, cholecystectomy, and  who presents for RA lap sigmoidectomy. Colonoscopy conducted showed a 5cm polypoid mass in the sigmoid colon with biopsy as adenocarcinoma. Staging CT was performed  without evidence of metastatic disease. Patient now s/p RA laparoscopic sigmoidectomy .    Pt seen in room for nutrition assessment. Pt's , Dustin, was present at bedside. Pt reports fair appetite PTA and during hospital stay. As per diet recall PTA: pt endorsed she eats yogurt in the morning, salad in the afternoon, and chicken (or other protein) with potatoes or rice for supper. Currently on [employer] Kosher diet, tolerating fairly, noted with 25-50% PO intakes overall. Noted with Kosher food preferences. NKFA. Denies wt changes, reports wt stability at current wt. Dosing wt: 174#, IBW: 100#, pt is 174% of IBW. Denies N/V/D, -FM, BM pending. No edema. Skin: RLQ surgical incision. John: 21. No issues chewing or swallowing noted. Denies pain. Labs reviewed: ; RD to continue to monitor trends. Nutritionally pertinent medications: RD observed pt with no overt signs of muscle or fat wasting. Based on ASPEN guidelines, pt does not meet criteria for malnutrition at this time. Pt amenable to education; RD provided education in regards to the importance of adequate macro and micronutrients, as well as hydration to support ADLs, maintain energy levels and overall functional/nutritional status. General healthful education provided. Nutrient dense foods promoted. Low Fiber diet education provided, RD discussed examples of foods to include and limit at this time, written handouts provided. Pt was receptive and verbalized understanding. No additional nutrition-related concerns. Will continue to follow per RD protocol. Additional nutrition recommendations below to follow.

## 2023-05-18 NOTE — DIETITIAN INITIAL EVALUATION ADULT - OTHER CALCULATIONS
Based on Standards of Care pt >% IBW thus ideal body weight used for all calculations. Needs adjusted based on age, postoperative status, clinical status.

## 2023-05-19 ENCOUNTER — TRANSCRIPTION ENCOUNTER (OUTPATIENT)
Age: 48
End: 2023-05-19

## 2023-05-19 VITALS
SYSTOLIC BLOOD PRESSURE: 134 MMHG | RESPIRATION RATE: 17 BRPM | TEMPERATURE: 99 F | HEART RATE: 69 BPM | OXYGEN SATURATION: 96 % | DIASTOLIC BLOOD PRESSURE: 81 MMHG

## 2023-05-19 LAB
ANION GAP SERPL CALC-SCNC: 7 MMOL/L — SIGNIFICANT CHANGE UP (ref 5–17)
BUN SERPL-MCNC: 5 MG/DL — LOW (ref 7–23)
CALCIUM SERPL-MCNC: 8.4 MG/DL — SIGNIFICANT CHANGE UP (ref 8.4–10.5)
CHLORIDE SERPL-SCNC: 101 MMOL/L — SIGNIFICANT CHANGE UP (ref 96–108)
CO2 SERPL-SCNC: 28 MMOL/L — SIGNIFICANT CHANGE UP (ref 22–31)
CREAT SERPL-MCNC: 0.67 MG/DL — SIGNIFICANT CHANGE UP (ref 0.5–1.3)
EGFR: 106 ML/MIN/1.73M2 — SIGNIFICANT CHANGE UP
GLUCOSE SERPL-MCNC: 85 MG/DL — SIGNIFICANT CHANGE UP (ref 70–99)
HCT VFR BLD CALC: 36 % — SIGNIFICANT CHANGE UP (ref 34.5–45)
HGB BLD-MCNC: 11.5 G/DL — SIGNIFICANT CHANGE UP (ref 11.5–15.5)
MAGNESIUM SERPL-MCNC: 1.7 MG/DL — SIGNIFICANT CHANGE UP (ref 1.6–2.6)
MCHC RBC-ENTMCNC: 30.4 PG — SIGNIFICANT CHANGE UP (ref 27–34)
MCHC RBC-ENTMCNC: 31.9 GM/DL — LOW (ref 32–36)
MCV RBC AUTO: 95.2 FL — SIGNIFICANT CHANGE UP (ref 80–100)
NRBC # BLD: 0 /100 WBCS — SIGNIFICANT CHANGE UP (ref 0–0)
PHOSPHATE SERPL-MCNC: 2.8 MG/DL — SIGNIFICANT CHANGE UP (ref 2.5–4.5)
PLATELET # BLD AUTO: 188 K/UL — SIGNIFICANT CHANGE UP (ref 150–400)
POTASSIUM SERPL-MCNC: 3.6 MMOL/L — SIGNIFICANT CHANGE UP (ref 3.5–5.3)
POTASSIUM SERPL-SCNC: 3.6 MMOL/L — SIGNIFICANT CHANGE UP (ref 3.5–5.3)
RBC # BLD: 3.78 M/UL — LOW (ref 3.8–5.2)
RBC # FLD: 12.8 % — SIGNIFICANT CHANGE UP (ref 10.3–14.5)
SODIUM SERPL-SCNC: 136 MMOL/L — SIGNIFICANT CHANGE UP (ref 135–145)
WBC # BLD: 6.77 K/UL — SIGNIFICANT CHANGE UP (ref 3.8–10.5)
WBC # FLD AUTO: 6.77 K/UL — SIGNIFICANT CHANGE UP (ref 3.8–10.5)

## 2023-05-19 PROCEDURE — C1889: CPT

## 2023-05-19 PROCEDURE — 36415 COLL VENOUS BLD VENIPUNCTURE: CPT

## 2023-05-19 PROCEDURE — 83735 ASSAY OF MAGNESIUM: CPT

## 2023-05-19 PROCEDURE — 88304 TISSUE EXAM BY PATHOLOGIST: CPT

## 2023-05-19 PROCEDURE — 86850 RBC ANTIBODY SCREEN: CPT

## 2023-05-19 PROCEDURE — 82962 GLUCOSE BLOOD TEST: CPT

## 2023-05-19 PROCEDURE — 80048 BASIC METABOLIC PNL TOTAL CA: CPT

## 2023-05-19 PROCEDURE — 88309 TISSUE EXAM BY PATHOLOGIST: CPT

## 2023-05-19 PROCEDURE — 86900 BLOOD TYPING SEROLOGIC ABO: CPT

## 2023-05-19 PROCEDURE — 88342 IMHCHEM/IMCYTCHM 1ST ANTB: CPT

## 2023-05-19 PROCEDURE — 86901 BLOOD TYPING SEROLOGIC RH(D): CPT

## 2023-05-19 PROCEDURE — 85027 COMPLETE CBC AUTOMATED: CPT

## 2023-05-19 PROCEDURE — 84100 ASSAY OF PHOSPHORUS: CPT

## 2023-05-19 PROCEDURE — S2900: CPT

## 2023-05-19 PROCEDURE — 88341 IMHCHEM/IMCYTCHM EA ADD ANTB: CPT

## 2023-05-19 RX ORDER — DOCUSATE SODIUM 100 MG
1 CAPSULE ORAL
Qty: 14 | Refills: 0
Start: 2023-05-19 | End: 2023-05-25

## 2023-05-19 RX ORDER — PHENTERMINE HCL 30 MG
1 CAPSULE ORAL
Refills: 0 | DISCHARGE

## 2023-05-19 RX ORDER — OXYCODONE HYDROCHLORIDE 5 MG/1
1 TABLET ORAL
Qty: 12 | Refills: 0
Start: 2023-05-19 | End: 2023-05-21

## 2023-05-19 RX ADMIN — Medication 15 MILLIGRAM(S): at 02:13

## 2023-05-19 RX ADMIN — Medication 1000 MILLIGRAM(S): at 00:00

## 2023-05-19 RX ADMIN — Medication 15 MILLIGRAM(S): at 07:32

## 2023-05-19 RX ADMIN — Medication 1000 MILLIGRAM(S): at 06:27

## 2023-05-19 RX ADMIN — HEPARIN SODIUM 5000 UNIT(S): 5000 INJECTION INTRAVENOUS; SUBCUTANEOUS at 06:27

## 2023-05-19 NOTE — DISCHARGE NOTE PROVIDER - HOSPITAL COURSE
Patient is a 49yo Female w/no PMHx and PSHx of LSG, cholecystectomy, and  who presents for RA lap sigmoidectomy. Colonoscopy conducted showed a 5cm polypoid mass in the sigmoid colon with biopsy as adenocarcinoma. Staging CT was performed  without evidence of metastatic disease. Patient underwent RA laparoscopic sigmoidectomy  which the patient tolerated well. The rest of the patient's hospital course was unremarkable. Their pain was well controlled, gerard was removed and passed TOV, they were able to tolerate a low fiber diet without nausea or vomiting, they had return of normal bowel function, and they were able to ambulate without assistance. At time of discharge, patient was afebrile, HDS, and deemed clinically fit for discharge home.

## 2023-05-19 NOTE — PROGRESS NOTE ADULT - SUBJECTIVE AND OBJECTIVE BOX
INTERVAL HPI/OVERNIGHT EVENTS: -F/-BM, jason diet.     STATUS POST: RA Lap Sigmoidectomy    POST OPERATIVE DAY #: 3    SUBJECTIVE: Patient seen this AM by chief resident and team 1 surgery. Patient tolerating CLD and had noted flatus and BM overnight. No N/V/F/C. No acute complaints.      heparin   Injectable 5000 Unit(s) SubCutaneous every 8 hours      Vital Signs Last 24 Hrs  T(C): 36.6 (19 May 2023 05:36), Max: 37.1 (18 May 2023 20:25)  T(F): 97.9 (19 May 2023 05:36), Max: 98.7 (18 May 2023 20:25)  HR: 65 (19 May 2023 05:36) (60 - 65)  BP: 124/80 (19 May 2023 05:36) (104/66 - 124/80)  BP(mean): --  RR: 17 (19 May 2023 05:36) (17 - 18)  SpO2: 96% (19 May 2023 05:36) (96% - 98%)    Parameters below as of 19 May 2023 05:36  Patient On (Oxygen Delivery Method): room air      I&O's Detail    18 May 2023 07:01  -  19 May 2023 07:00  --------------------------------------------------------  IN:    IV PiggyBack: 250 mL    Oral Fluid: 830 mL  Total IN: 1080 mL    OUT:    Voided (mL): 2000 mL  Total OUT: 2000 mL    Total NET: -920 mL          General: NAD, resting comfortably in bed  C/V: NSR  Pulm: Nonlabored breathing, no respiratory distress  Abd: soft, NT/ND. Incisions c/d/i  Extrem: WWP, no edema, SCDs in place        LABS:                        11.5   6.77  )-----------( 188      ( 19 May 2023 05:30 )             36.0     05-18    138  |  105  |  5<L>  ----------------------------<  94  4.0   |  27  |  0.80    Ca    7.8<L>      18 May 2023 07:39  Phos  2.0     05-18  Mg     1.8     05-18            RADIOLOGY & ADDITIONAL STUDIES:

## 2023-05-19 NOTE — DISCHARGE NOTE PROVIDER - CARE PROVIDER_API CALL
Gavin Valdez)  ColonRectal Surgery; Surgery  [facility], [address]nd [address]  [place], [place] [address]  Phone: [phone]  Fax: [phone]  Follow Up Time:

## 2023-05-19 NOTE — PROGRESS NOTE ADULT - ASSESSMENT
Patient is a 47yo Female w/no PMHx and PSHx of LSG, cholecystectomy, and  who presents for RA lap sigmoidectomy. Colonoscopy conducted showed a 5cm polypoid mass in the sigmoid colon with biopsy as adenocarcinoma. Staging CT was performed  without evidence of metastatic disease. Patient now s/p RA laparoscopic sigmoidectomy .    ERAS  Adv to LRD  Pain/Nausea control PRN  HSQ/SCDs  IS/OOBA  Nutrition recs  DC home 
48F w/no PMHx and PSHx of LSG, cholecystectomy, and  who presents for RA lap sigmoidectomy. Colonoscopy conducted showed a 5cm polypoid mass in the sigmoid colon with biopsy as adenocarcinoma. Staging CT was performed  without evidence of metastatic disease. Patient now s/p RA laparoscopic sigmoidectomy .    ERAS  CLD kosher/IVF@40cc/hr  Pain/Nausea control PRN  Isbell   HSQ/SCDs  IS/OOBA
Patient is a 47yo Female w/no PMHx and PSHx of LSG, cholecystectomy, and  who presents for RA lap sigmoidectomy. Colonoscopy conducted showed a 5cm polypoid mass in the sigmoid colon with biopsy as adenocarcinoma. Staging CT was performed  without evidence of metastatic disease. Patient now s/p RA laparoscopic sigmoidectomy . Awaiting return of bowel function    ERAS  CLDIVHL  Pain/Nausea control PRN  HSQ/SCDs  IS/OOBA  F/u nutrition recs

## 2023-05-19 NOTE — DISCHARGE NOTE NURSING/CASE MANAGEMENT/SOCIAL WORK - PATIENT PORTAL LINK FT
You can access the FollowMyHealth Patient Portal offered by [facility] by registering at the following website: [email or web address]. By joining [employer]’s FollowMyHealth portal, you will also be able to view your health information using other applications (apps) compatible with our system.

## 2023-05-19 NOTE — DISCHARGE NOTE PROVIDER - NSDCFUADDINST_GEN_ALL_CORE_FT
Please follow up with Dr. Valdez next week. Call his office to schedule an appointment: [phone]. Call the office if you experience increasing pain, nausea, vomiting, swelling, redness, or temperature >101.4F.     Please do not take your Phentermine until follow up with your primary care physician.    Pain control:  Please take 2 tabs of extra strength Tylenol every 6 hours as needed for pain. DO NOT exceed 4000 mg per day. You have been prescribed oxycodone for pain not controlled by Tylenol. Take 1 tab every 6 hours as needed for severe pain. Please do not exceed 4 tabs per day. Take prescribed stool softener (colace) to prevent constipation caused by oxycodone       General Discharge Instructions:  Please resume all regular home medications unless specifically advised not to take a particular medication. Also, please take any new medications as prescribed.  Please get plenty of rest, continue to ambulate several times per day, and drink adequate amounts of fluids. Avoid lifting weights greater than 5-10 lbs until you follow-up with your surgeon, who will instruct you further regarding activity restrictions.  Avoid driving or operating heavy machinery while taking pain medications.  Please follow-up with your surgeon and Primary Care Provider (PCP) as advised.  Incision Care:  *Please call your doctor or nurse practitioner if you have increased pain, swelling, redness, or drainage from the incision site.  *Avoid swimming and baths until your follow-up appointment.  *You may shower, and wash surgical incisions with a mild soap and warm water. Gently pat the area dry.    Warning Signs:  Please call your doctor or nurse practitioner if you experience the following:  *You experience new chest pain, pressure, squeezing or tightness.  *New or worsening cough, shortness of breath, or wheeze.  *If you are vomiting and cannot keep down fluids or your medications.  *You are getting dehydrated due to continued vomiting, diarrhea, or other reasons. Signs of dehydration include dry mouth, rapid heartbeat, or feeling dizzy or faint when standing.  *You see blood or dark/black material when you vomit or have a bowel movement.  *You experience burning when you urinate, have blood in your urine, or experience a discharge.  *Your pain is not improving within 8-12 hours or is not gone within 24 hours. Call or return immediately if your pain is getting worse, changes location, or moves to your chest or back.  *You have shaking chills, or fever greater than 101.5 degrees Fahrenheit or 38 degrees Celsius.  *Any change in your symptoms, or any new symptoms that concern you.

## 2023-05-19 NOTE — PROGRESS NOTE ADULT - REASON FOR ADMISSION
Elective admission for Robotic assist Sigmoid colectomy

## 2023-05-19 NOTE — DISCHARGE NOTE NURSING/CASE MANAGEMENT/SOCIAL WORK - NSDCPEFALRISK_GEN_ALL_CORE
For information on Fall & Injury Prevention, visit: [email or web address] OR  [email or web address] OR  https://www.cdc.gov/steadi/patient.html

## 2023-05-19 NOTE — DISCHARGE NOTE PROVIDER - NSDCMRMEDTOKEN_GEN_ALL_CORE_FT
docusate sodium 100 mg oral tablet: 1 tab(s) orally 2 times a day as needed for  constipation Please take if using oxycodone  Dannie FE 1/20 oral tablet: 1 orally once a day  oxyCODONE 5 mg oral tablet: 1 tab(s) orally every 6 hours as needed for  severe pain Please take for pain uncontrolled by tylenol/motrin. Please take colace if using oxycodone MDD: 4

## 2023-05-23 LAB — SURGICAL PATHOLOGY STUDY: SIGNIFICANT CHANGE UP

## 2023-05-24 DIAGNOSIS — Z98.891 HISTORY OF UTERINE SCAR FROM PREVIOUS SURGERY: ICD-10-CM

## 2023-05-24 DIAGNOSIS — Z98.84 BARIATRIC SURGERY STATUS: ICD-10-CM

## 2023-05-24 DIAGNOSIS — Z79.899 OTHER LONG TERM (CURRENT) DRUG THERAPY: ICD-10-CM

## 2023-05-24 DIAGNOSIS — Z90.3 ACQUIRED ABSENCE OF STOMACH [PART OF]: ICD-10-CM

## 2023-05-24 DIAGNOSIS — C18.7 MALIGNANT NEOPLASM OF SIGMOID COLON: ICD-10-CM

## 2023-05-24 DIAGNOSIS — Z90.49 ACQUIRED ABSENCE OF OTHER SPECIFIED PARTS OF DIGESTIVE TRACT: ICD-10-CM

## 2023-06-09 ENCOUNTER — APPOINTMENT (OUTPATIENT)
Dept: COLORECTAL SURGERY | Facility: CLINIC | Age: 48
End: 2023-06-09
Payer: COMMERCIAL

## 2023-06-09 VITALS
TEMPERATURE: 97.6 F | SYSTOLIC BLOOD PRESSURE: 134 MMHG | HEART RATE: 90 BPM | HEIGHT: 60 IN | BODY MASS INDEX: 35.34 KG/M2 | WEIGHT: 180 LBS | DIASTOLIC BLOOD PRESSURE: 86 MMHG

## 2023-06-09 PROCEDURE — 99024 POSTOP FOLLOW-UP VISIT: CPT

## 2023-06-09 NOTE — PHYSICAL EXAM
[Abdomen Masses] : No abdominal masses [Abdomen Tenderness] : ~T No ~M abdominal tenderness [No HSM] : no hepatosplenomegaly [JVD] : no jugular venous distention  [Normal Breath Sounds] : Normal breath sounds [Normal Heart Sounds] : normal heart sounds [No Rash or Lesion] : No rash or lesion [Alert] : alert [de-identified] : Abdomen is soft, nontender, nondistended. Incisions are well healed. No hernia or masses\par

## 2023-06-09 NOTE — HISTORY OF PRESENT ILLNESS
[FreeTextEntry1] : 47 y/o F presents in first post operative evaluation of sigmoid colon CA, s/p robotic assisted sigmoidectomy 23\par \par PSH  (14 years ago), gastric bypass (9 years ago), cholecystectomy (2022)\par \par \par  Pathology             Amended\par \par No Documents Attached\par \par \par \par \par   [facility] Accession Number : [id number]\par Patient:   FAN RUELAS\par \par \par Accession:                             [id number]\par \par Collected Date/Time:                   2023 15:16 EDT\par Received Date/Time:                    2023 16:46 EDT\par \par Addendum Report - Auth (Verified)\par \par Addendum\par This report is issued to provide the additional results/findings on this\par case.  All contents in the original report remain otherwise the same.\par \par Interpretation of Mismatch Repair (MMR): No loss of nuclear expression of\par MMR Proteins: Low Probability of MSI-H.  See note.\par \par Immunohistochemistry (IHC) Results for Mismatch Repair (MMR): Block 1E\par \par RESULTS\par Antibody                      Results\par MLH1                           Intact nuclear expression\par MSH2                           Intact nuclear expression\par MSH6                           Intact nuclear expression\par PMS2                            Intact nuclear expression\par \par Verified by: Diann Evans MD\par (Electronic Signature)\par Reported on: 23 14:19 EDT, [facility], [address] [place] [address]\[facility] Phone: [phone]   Fax: [phone]\par _________________________________________________________________\par \par Disclaimer\par These immunohistochemical tests have been developed and their performance\par characteristics determined by [facility], Department of\par Pathology, Division of Immunopathology, [address] [place] [address].  It has not been cleared or approved by the U.[place]. Food and\par Drug Administration.  The FDA has determined that such clearance or\par approval is not necessary.  This test is used for clinical purposes.  The\par \par laboratory is certified under the CLIA-88 as qualified to perform high-\par complexity clinical testing.\par Surgical Pathology Report - Auth (Verified)\par \par Specimen(s) Submitted\par 1  Sigmoid, rectum\par 2  Distal donut\par 3  Proximal donut\par \par Final Diagnosis\par 1.  Sigmoid, proximal rectum; sigmoidectomy:\par -   Invasive adenocarcinoma, moderately differentiated; invades into\par muscularis propria; 2.2 cm in greatest dimension.  See synoptic report.\par -   Twenty nine lymph nodes negative for carcinoma (0/29).\par \par 2.  Distal donut:\par -   Portion of colon without significant histopathologic findings.\par \par 3.  Proximal donut:\par -   Portion of colon without significant histopathologic findings.\par \par The case was reviewed by another pathologist (KETAN) who concurs with the\par diagnosis.\par \par Verified by: Diann Evans MD\par (Electronic Signature)\par Reported on: 23 11:12 EDT, [facility], [address] [place] [address],\[place] [address]\par Phone: [phone]   Fax: [phone]\par _________________________________________________________________\par \par Synoptic Summary\par 1: Colon and Rectum - Resection\par Specimen\par Procedure:  Sigmoidectomy\par \par Tumor\par Tumor Site:  Sigmoid colon\par Histologic Type:   Adenocarcinoma\par Histologic Grade:   G2, moderately differentiated\par Tumor Size:  2.2 Centimeters (cm)\par Tumor Extent:   Invades into muscularis propria\par Macroscopic Tumor Perforation:   Not identified\par Lymphovascular Invasion:   Not identified\par Perineural Invasion:   Not identified\par Treatment Effect:   No known presurgical therapy\par Margins\par Margin Status for Invasive Carcinoma:    All margins negative for\par invasive carcinoma\par Margin Status for Non-Invasive Tumor:    All margins negative for\par high-grade dysplasia / intramucosal carcinoma and low-grade\par dysplasia\par Regional Lymph Nodes\par Regional Lymph Node Status:   All regional lymph nodes negative for\par tumor\par Number of Lymph Nodes Examined:   29\par Tumor Deposits:   Not identified\par Pathologic Stage Classification (pTNM, AJCC 8th Edition)\par pT Category:   pT2\par pN Category:   pN0\par Prior Biopsy ([facility] Standard 2.1)\par A biopsy was performed and read elsewhere and reviewed:  75-\par [id number]\par Additional Findings\par Additional Findings:   Adenoma(s)\par Comments\par Immunohistochemical staining for Mismatch Repair (MMR) is pending and\par the results will be issued as an addendum.\par Best Tumor Blocks for Future Studies\par Tumor Block(s):   1D, 1E, 1F\par Normal Block(s):   1A, 1B\par \par CAP eCC  Q2 Release\par \par Clinical Information\par Colon cancer\par \par \par Gross Description\par 1.  Received: Fresh labeled  "sigmoid, rectum"\par Integrity:  Intact\par Orientation:  None provided\par One End:  Open, 4 cm in circumference\par Opposing End:  Staple, 5 cm in circumference\par Colon\par Length:  26.5 cm\par Colonic Circumference:   4-5 cm\par Mesenteric Width:  2 cm\par Mesorectal Width:  Not applicable\par Wall Thickness:  0.3 cm\par Inking\par Serosa:  Black\par Area of Interest -  mass\par Size:  2.2 x 1.8 x 0.6 cm, tan, well-circumscribed exophytic mass\par Location:  Mucosal\par Anterior Peritoneal Reflection:    NA\par Posterior Peritoneal Reflection:    NA\par % Circumferential Involvement:   40%\par Overlying Serosa:  Contracted\par Cut Surface:  White and firm\par Extension:  Confined to the mucosa\par Distance to Margin at One End:   3.5 cm\par Distance to Margin at Opposing End:   17.2 cm\par Distance to Additional Margins/Landmarks\par Mesenteric:  4 cm\par Remaining Mucosa:  Pink-tan and unremarkable\par Remaining Serosa:  Pink-tan, smooth and unremarkable\par Lumen:  No abnormality\par Attached Mesentery:  Yellow, soft and intact\par Mesorectal Margin:  Incomplete\par Possible Lymph Node(s):   Multiple , 0.5 x 0.5 x 0.4 cm in greatest\par dimension\par Cut surfaces:  Unremarkable\par Submitted:  Representative sections in 25 cassettes:\par 1A: One end margin, shaved\par 1B: Opposite end Margin, shave\par 1C: Mesenteric margin\par 1D-1J: Mass\par 1K: Uninvolved mucosa\par 1L: 12 possible lymph nodes\par 1M: 12 possible lymph nodes\par \par 1N: 10 possible lymph nodes\par 1O: 2 possible lymph nodes\par 1P-1Y: Fat\par \par 2.  Received: In formalin labeled  "distal donut"\par Size:  One fragment measuring 2.5 x 1.5 x 0.5 cm\par Description:  piece of colon\par Submitted:  Entirely in two cassettes: 2A-2B\par \par 3.  Received: In formalin labeled  "proximal donut"\par Size:  One fragment measuring 1.5 x 0.8 x 2.4 cm\par Description:  piece of colon\par Submitted:  Entirely in one cassette: 3A\par \par João Perez 2023 03:21 PM\par \par Disclaimer\par In addition to other data that may appear on the specimen containers, all\par labels have been inspected to confirm the presence of the patient's name\par and date of birth.\par Histological Processing Performed at [facility], Department of\par Pathology, [address] [place], [place].\par \par  \par Recovering well from surgery.\par \par \par   \par

## 2023-06-09 NOTE — ASSESSMENT
[FreeTextEntry1] : Recovered well from surgery.\par \par Liberalize diet and activity.\par \par Stage I colon cancer.  No indication for postoperative treatment.\par \par Advised follow-up in 1 year.  Recommend return to GI in 1 year for surveillance colonoscopy.

## 2023-06-23 PROBLEM — C18.9 MALIGNANT NEOPLASM OF COLON, UNSPECIFIED: Chronic | Status: ACTIVE | Noted: 2023-05-15

## 2024-05-31 ENCOUNTER — APPOINTMENT (OUTPATIENT)
Dept: COLORECTAL SURGERY | Facility: CLINIC | Age: 49
End: 2024-05-31
Payer: COMMERCIAL

## 2024-05-31 VITALS
BODY MASS INDEX: 39.27 KG/M2 | HEIGHT: 60 IN | WEIGHT: 200 LBS | DIASTOLIC BLOOD PRESSURE: 85 MMHG | HEART RATE: 80 BPM | TEMPERATURE: 97.6 F | SYSTOLIC BLOOD PRESSURE: 142 MMHG

## 2024-05-31 DIAGNOSIS — C18.7 MALIGNANT NEOPLASM OF SIGMOID COLON: ICD-10-CM

## 2024-05-31 PROCEDURE — 99214 OFFICE O/P EST MOD 30 MIN: CPT

## 2024-05-31 NOTE — HISTORY OF PRESENT ILLNESS
[FreeTextEntry1] : 48 yo F with history of sigmoid colon ca, s/p robotic assisted sigmoidectomy 23, path c/w pT2N0, MMR intact, presents for annual follow up   PSH  (14 years ago), gastric bypass (9 years ago), cholecystectomy (2022) Medication: Oral contraceptive, ASA 81 mg, phentermine Denies [facility]  Patient diagnosed on initial colonoscopy w/ Dr. Ronaldo Longo on 2023, prep fair. Melanosis coli, redundant colon Tumor in proximal sigmoid colon, biopsied, tattooed Plan: PET/CT  Pathology: Minute superficial fragment of adenocarcinoma Separate fragment of colonic mucosa showing focal hyperplastic change Note: Depth of invasion cannot be accurately evaluated on this superficial fragment of biopsy specimen  Initial CEA drawn as part of pre-op work up with PCP (5/10/2023):  CEA 2.1, hct/hct 13.6/41.0, AST 16, ALT 11   Staging CT c/a/p completed 2023: IMPRESSION: Negative for adenopathy or metastatic disease.   S/p robotic assisted sigmoidectomy 23  Surgical Pathology Report - Auth (Verified) Specimen(s) Submitted 1  Sigmoid, rectum 2  Distal donut 3  Proximal donut Final Diagnosis 1.  Sigmoid, proximal rectum; sigmoidectomy: -   Invasive adenocarcinoma, moderately differentiated; invades into muscularis propria; 2.2 cm in greatest dimension.  See synoptic report. -   Twenty nine lymph nodes negative for carcinoma (0/29).  2.  Distal donut: -   Portion of colon without significant histopathologic findings.  3.  Proximal donut: -   Portion of colon without significant histopathologic findings.  Synoptic Summary 1: Colon and Rectum - Resection Specimen Procedure:  Sigmoidectomy  Tumor Tumor Site:  Sigmoid colon Histologic Type:   Adenocarcinoma Histologic Grade:   G2, moderately differentiated Tumor Size:  2.2 Centimeters (cm) Tumor Extent:   Invades into muscularis propria Macroscopic Tumor Perforation:   Not identified Lymphovascular Invasion:   Not identified Perineural Invasion:   Not identified Treatment Effect:   No known presurgical therapy Margins Margin Status for Invasive Carcinoma:    All margins negative for invasive carcinoma Margin Status for Non-Invasive Tumor:    All margins negative for high-grade dysplasia / intramucosal carcinoma and low-grade dysplasia Regional Lymph Nodes Regional Lymph Node Status:   All regional lymph nodes negative for tumor Number of Lymph Nodes Examined:   29 Tumor Deposits:   Not identified Pathologic Stage Classification (pTNM, AJCC 8th Edition) pT Category:   pT2 pN Category:   pN0 MMR intact  Most recently seen in follow up 23, Patient recovering well. On exam, incisions well healed. Pt recommended liberalize diet and activity. Stage I colon cancer. No indications for postoperative treatment. Advised f/u in 1 year.   Pt doing well, but concerned about 20 lb weight gain since surgery and also admits to gassiness since surgery which does not cause any abdominal pain or n/v. Of note, pt is perimenopausal (last menses 4 months ago) and also no longer taking phentermine. Reports BMs are variable and may have constipation w/o defecation for over one week or go several times daily. Admits to h/o chronic constipation and previous chronic laxative use (on Herbilax and Dulcolax), but hasn't been taking regularly due to advice to avoid laxatives. Not taking Miralax or Fiber supplement Taking probiotic. Admits could improve water intake and dietary fiber intake  Completed one year surveillance colonoscopy 24: Non bleeding internal hemorrhoids Patent end to end anastomosis at 25 cm from AV characterized by healthy appearing mucosa Redundant colon Exam otherwise normal, no specimens collected Per pt advised repeat scope in 3 years

## 2024-05-31 NOTE — PHYSICAL EXAM
[Abdomen Masses] : No abdominal masses [Abdomen Tenderness] : ~T No ~M abdominal tenderness [No HSM] : no hepatosplenomegaly [JVD] : no jugular venous distention  [Normal Breath Sounds] : Normal breath sounds [Normal Heart Sounds] : normal heart sounds [No Rash or Lesion] : No rash or lesion [Alert] : alert [de-identified] : Abdomen is soft, nontender, nondistended. Incisions are well healed. No hernia or masses\par

## 2024-05-31 NOTE — ASSESSMENT
[FreeTextEntry1] : Stage I colon cancer.  Status post resection 1 year prior.   Clinically HUSAM.  Recommend MiraLAX to improve bowel habits.  Follow-up with GI for surveillance colonoscopy in 3 years.

## (undated) DEVICE — SUT MONOCRYL 4-0 27" PS-2 UNDYED

## (undated) DEVICE — SYR LUER LOK 30CC

## (undated) DEVICE — SUT VICRYL 0 18" ENDOLOOP LIGATURE

## (undated) DEVICE — SUT SILK 3-0 18" SH (POP-OFF)

## (undated) DEVICE — POSITIONER PINK PAD PIGAZZI SYSTEM XL W ARM PROTECTOR

## (undated) DEVICE — ELCTR BOVIE PENCIL HANDPIECE ROCKER SWITCH 15FT

## (undated) DEVICE — D HELP - CLEARVIEW CLEARIFY SYSTEM

## (undated) DEVICE — XI SEAL UNIV 5- 8 MM

## (undated) DEVICE — XI ENDOWRIST 12 - 8 MM CANNULA REDUCER

## (undated) DEVICE — SUT VICRYL PLUS 0 36" CT-1

## (undated) DEVICE — GLV 7.5 PROTEXIS (WHITE)

## (undated) DEVICE — SPECIMEN CONTAINER 4OZ

## (undated) DEVICE — SPONGE ENDO PEANUT 5MM

## (undated) DEVICE — XI TIP COVER

## (undated) DEVICE — Device

## (undated) DEVICE — POSITIONER STRAP KNEE & BODY 3X60" DISP

## (undated) DEVICE — DRAPE 1/2 SHEET 40X57"

## (undated) DEVICE — XI DRAPE COLUMN

## (undated) DEVICE — XI DRAPE ARM

## (undated) DEVICE — SUT PDS II PLUS 1 27" CT-1

## (undated) DEVICE — STOPCOCK 4-WAY

## (undated) DEVICE — NDL SPINAL 22G X 3.5" (BLACK)

## (undated) DEVICE — TUBING STRYKER PNEUMOCLEAR HIGH FLOW HEATED

## (undated) DEVICE — XI ARM SCISSOR MONO CURVED

## (undated) DEVICE — XI ARM NEEDLE DRIVER LARGE

## (undated) DEVICE — STAPLER COVIDIEN ENDO GIA SHORT HANDLE

## (undated) DEVICE — FOLEY TRAY 16FR 5CC LF UMETER CLOSED

## (undated) DEVICE — SUT VICRYL 3-0 27" SH

## (undated) DEVICE — MARKING PEN W RULER

## (undated) DEVICE — XI ARM FORCEP FENESTRATED BIPOLAR 8MM

## (undated) DEVICE — XI VESSEL SEALER

## (undated) DEVICE — DRAPE 3/4 SHEET 52X76"

## (undated) DEVICE — GLV 8 PROTEXIS (WHITE)

## (undated) DEVICE — PACK PERI GYN

## (undated) DEVICE — XI STAPLER SUREFORM 60

## (undated) DEVICE — XI ARM GRASPER TIP UP FENESTRATED

## (undated) DEVICE — PACK GENERAL CLOSING

## (undated) DEVICE — BLADE SURGICAL #15 CARBON

## (undated) DEVICE — XI 12MM AND STAPLER CANNULA SEAL

## (undated) DEVICE — KIT ENDO PROCEDURE CUST W/VLV

## (undated) DEVICE — DRSG DERMABOND 0.7ML

## (undated) DEVICE — WARMING BLANKET UPPER ADULT

## (undated) DEVICE — LIGASURE BLUNT TIP 37CM

## (undated) DEVICE — VENODYNE/SCD SLEEVE CALF MEDIUM

## (undated) DEVICE — TIP METZENBAUM SCISSOR MONOPOLAR ENDOCUT (ORANGE)

## (undated) DEVICE — PACK GENERAL LAPAROSCOPY

## (undated) DEVICE — DRAPE TOP SHEET 53" X 101"

## (undated) DEVICE — XI OBTURATOR OPTICAL BLADELESS 8MM

## (undated) DEVICE — SUT VICRYL 0 54" TIES

## (undated) DEVICE — INSUFFLATION NDL COVIDIEN SURGINEEDLE VERESS 120MM